# Patient Record
Sex: FEMALE | Race: WHITE | NOT HISPANIC OR LATINO
[De-identification: names, ages, dates, MRNs, and addresses within clinical notes are randomized per-mention and may not be internally consistent; named-entity substitution may affect disease eponyms.]

---

## 2019-06-14 ENCOUNTER — RESULT REVIEW (OUTPATIENT)
Age: 34
End: 2019-06-14

## 2020-05-13 ENCOUNTER — RESULT REVIEW (OUTPATIENT)
Age: 35
End: 2020-05-13

## 2020-08-12 ENCOUNTER — RESULT REVIEW (OUTPATIENT)
Age: 35
End: 2020-08-12

## 2021-01-05 ENCOUNTER — APPOINTMENT (OUTPATIENT)
Dept: PODIATRY | Facility: CLINIC | Age: 36
End: 2021-01-05
Payer: COMMERCIAL

## 2021-01-05 VITALS — WEIGHT: 115 LBS | BODY MASS INDEX: 18.05 KG/M2 | HEIGHT: 67 IN

## 2021-01-05 DIAGNOSIS — S90.32XA CONTUSION OF LEFT FOOT, INITIAL ENCOUNTER: ICD-10-CM

## 2021-01-05 DIAGNOSIS — Z78.9 OTHER SPECIFIED HEALTH STATUS: ICD-10-CM

## 2021-01-05 DIAGNOSIS — Z83.49 FAMILY HISTORY OF OTHER ENDOCRINE, NUTRITIONAL AND METABOLIC DISEASES: ICD-10-CM

## 2021-01-05 DIAGNOSIS — Z80.8 FAMILY HISTORY OF MALIGNANT NEOPLASM OF OTHER ORGANS OR SYSTEMS: ICD-10-CM

## 2021-01-05 DIAGNOSIS — S92.515A NONDISPLACED FRACTURE OF PROXIMAL PHALANX OF LEFT LESSER TOE(S), INITIAL ENCOUNTER FOR CLOSED FRACTURE: ICD-10-CM

## 2021-01-05 DIAGNOSIS — Z87.891 PERSONAL HISTORY OF NICOTINE DEPENDENCE: ICD-10-CM

## 2021-01-05 PROBLEM — Z00.00 ENCOUNTER FOR PREVENTIVE HEALTH EXAMINATION: Status: ACTIVE | Noted: 2021-01-05

## 2021-01-05 PROCEDURE — 99203 OFFICE O/P NEW LOW 30 MIN: CPT | Mod: 25

## 2021-01-05 PROCEDURE — 73630 X-RAY EXAM OF FOOT: CPT | Mod: LT

## 2021-01-05 PROCEDURE — 99072 ADDL SUPL MATRL&STAF TM PHE: CPT

## 2021-01-05 PROCEDURE — 29540 STRAPPING ANKLE &/FOOT: CPT

## 2021-01-05 RX ORDER — CALCIUM CARBONATE 300MG(750)
0.4-32.5 TABLET,CHEWABLE ORAL
Refills: 0 | Status: ACTIVE | COMMUNITY

## 2021-01-05 NOTE — PHYSICAL EXAM
[General Appearance - Alert] : alert [General Appearance - In No Acute Distress] : in no acute distress [] : no rash [Skin Lesions] : no skin lesions Unable to assess [Sensation] : the sensory exam was normal to light touch and pinprick [No Focal Deficits] : no focal deficits [Deep Tendon Reflexes (DTR)] : deep tendon reflexes were 2+ and symmetric [Motor Exam] : the motor exam was normal [Oriented To Time, Place, And Person] : oriented to person, place, and time [Impaired Insight] : insight and judgment were intact [Affect] : the affect was normal [FreeTextEntry3] : Vascular exam reveals palpable pedal pulses, the foot is warm to touch, there was good capillary fill time, the skin is normal in appearance there is no evidence of vascular disease or compromise at this time [de-identified] : NWB on LLE 2/2 increased pain, swelling of 4th toe left foot, no evidence of out of alignment or dislocation [Foot Ulcer] : no foot ulcer [Skin Induration] : no skin induration [FreeTextEntry1] : mild swelling and ecchymosis to area of CC

## 2021-01-05 NOTE — HISTORY OF PRESENT ILLNESS
[Sneakers] : saulo [Crutches] : crutches [FreeTextEntry1] : Location:4th toe left foot\par Duration: recent trauma --stubbed against furniture\par Acute: yes\par Past Tx: self\par Exacerbated by:WB\par Prior Hx:no\par

## 2021-01-05 NOTE — REVIEW OF SYSTEMS
[Joint Swelling] : joint swelling [Joint Stiffness] : joint stiffness [Negative] : Psychiatric [Leg Claudication] : no intermittent leg claudication [Lower Ext Edema] : no lower extremity edema [Arthralgias] : no arthralgias [Joint Pain] : no joint pain [Limb Pain] : no limb pain [Limb Swelling] : no limb swelling

## 2021-01-05 NOTE — PROCEDURE
[FreeTextEntry1] : X-rays were taken in the office multiple views of the left foot\par ND fx PP left 4th toe\par after examination and reviewing of the x-rays with the patient it was determined that there was a fracture present. Possible etiologies and treatment options both conservative as well as surgical were discussed at length with the patient. Approximate healing times were discussed with the patient both surgical as well as conservative treatments. It was stressed that these are estimates and there are many variables regarding healing the patient to patient. The fracture care, fracture principles, and the following appropriate treatment advice was stressed to the patient. Compliance was also stressed. I went over my treatment suggestions which included but were not limited to be my advice on which treatment the patient should pursue, appropriate offloading if necessary, shoe gear and assistive devices, immobilization or splinting and possible orthotic therapy. I advised the patient that medical noncompliance regarding treatment and treatment suggestions could result in worsening of the fracture which could require future surgical intervention, nonhealing of the fracture, and long-term deformity and disability as well as chronic pain.\par I had a lengthy discussion with the patient and she requests immobilization via a cam walker. After reviewing the benefits as well as the risks of being placed in a cam walker the patient has agreed. Next a new cam walker was removed from its packaging and customized to fit the patient's foot and leg and was instructed on how to use the Cam Walker. They were advised that they need to stay in the cam walker at times except showering and sleeping. I advised the patient that they should not drive with a cam walker. The Cam Walker was placed on the appropriate limb, instructions on how to ambulate with the cam walker were reviewed and the patient showed a knowledge on how to walk, remove, and reapply the cam walker. Complete discharge instructions were given as well as a follow up appointment.\par follow up appt 4 weeks if sx persist

## 2021-01-05 NOTE — REASON FOR VISIT
[Initial Visit] : an initial visit for [Foot Pain] : foot pain [Spouse] : spouse [FreeTextEntry2] : left

## 2021-10-21 ENCOUNTER — APPOINTMENT (OUTPATIENT)
Dept: NEUROLOGY | Facility: CLINIC | Age: 36
End: 2021-10-21
Payer: COMMERCIAL

## 2021-10-21 DIAGNOSIS — H53.9 UNSPECIFIED VISUAL DISTURBANCE: ICD-10-CM

## 2021-10-21 DIAGNOSIS — G43.009 MIGRAINE W/OUT AURA, NOT INTRACTABLE, W/OUT STATUS MIGRAINOSUS: ICD-10-CM

## 2021-10-21 PROCEDURE — 99213 OFFICE O/P EST LOW 20 MIN: CPT | Mod: 95

## 2021-10-21 NOTE — PHYSICAL EXAM
[General Appearance - Alert] : alert [General Appearance - In No Acute Distress] : in no acute distress [General Appearance - Well Nourished] : well nourished [General Appearance - Well-Appearing] : healthy appearing [Oriented To Time, Place, And Person] : oriented to person, place, and time [Impaired Insight] : insight and judgment were intact [Affect] : the affect was normal [Memory Recent] : recent memory was not impaired [Cranial Nerves Facial (VII)] : face symmetrical [Cranial Nerves Vestibulocochlear (VIII)] : hearing was intact bilaterally [Involuntary Movements] : no involuntary movements were seen

## 2021-10-21 NOTE — HISTORY OF PRESENT ILLNESS
[FreeTextEntry1] : Pt is 37 yo RH F seen for follow up for recent ER admission.  \par \par Pt seen via telehealth\par \par Pt seen by me at [facility] ER on 9/22 for an episode of blurry vision.\par \par At the time, pt reported transient episode of blurry vision around 9:20am.  She reported seeing color lines in her right eye, forming a crescent shape of squiggly line ("sort of like statics in old tv"), lasting for 15min, then losing some vision for about 5min at the end. Symptom resolved in total of 20 min. Pt reports that this was actually the third time this episode happened during this pregnancy,  with 2 shorter episodes occurring during 2nd-3rd trimester . Pt with 2 prior deliveries, no similar episodes during then. Pt has sporadic hx of migraine in her lifetime, some with photophobia but never the same presentation that she has been experiencing.  \par \par All symptoms resolved prior to pt's visit. Pt was pending induced labor the following day, and was discharged home.\par \par Pt seen via telehealth today. Since then, pt reports 2 additional episodes: \par 9/27/21 : symptom in left eye , similar presentation\par 10/13/21: symptom in right eye, similar but no loss of vision.\par \par Pt denies any inc stress/worsening sleep during the episode. During current telehealth visit, pt denies any deficits.\par \par \par Soc Hx: no smoking, no etoh\par Fam Hx: mother with ocular migraine

## 2021-10-21 NOTE — ASSESSMENT
[FreeTextEntry1] : Pt is 37 yo RH F   presenting with blurry vision.  Pt pregnant at the time, now s/p induced labor.   symptom of mono-ocular vision changes with crescent shape and positive symptom (squiggly vision changes) at onset does not suggest stroke/retinal artery occlusion at onset. 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 Will order MRI head c+/c-, c spine c+/c- to rule out demyelinating dz\par -  no HA with symptoms. even if this was atypical migraine, no abortive/preventive meds needed at this time.\par - follow up after imaging.\par

## 2021-12-06 ENCOUNTER — RESULT REVIEW (OUTPATIENT)
Age: 36
End: 2021-12-06

## 2021-12-17 ENCOUNTER — RESULT REVIEW (OUTPATIENT)
Age: 36
End: 2021-12-17

## 2025-04-23 ENCOUNTER — NON-APPOINTMENT (OUTPATIENT)
Age: 40
End: 2025-04-23

## 2025-04-24 ENCOUNTER — APPOINTMENT (OUTPATIENT)
Dept: NEUROLOGY | Facility: CLINIC | Age: 40
End: 2025-04-24
Payer: COMMERCIAL

## 2025-04-24 ENCOUNTER — TRANSCRIPTION ENCOUNTER (OUTPATIENT)
Age: 40
End: 2025-04-24

## 2025-04-24 ENCOUNTER — NON-APPOINTMENT (OUTPATIENT)
Age: 40
End: 2025-04-24

## 2025-04-24 VITALS
WEIGHT: 149 LBS | SYSTOLIC BLOOD PRESSURE: 108 MMHG | DIASTOLIC BLOOD PRESSURE: 66 MMHG | HEIGHT: 67 IN | OXYGEN SATURATION: 98 % | HEART RATE: 65 BPM | BODY MASS INDEX: 23.39 KG/M2

## 2025-04-24 DIAGNOSIS — G43.109 MIGRAINE WITH AURA, NOT INTRACTABLE, W/OUT STATUS MIGRAINOSUS: ICD-10-CM

## 2025-04-24 DIAGNOSIS — Z82.3 FAMILY HISTORY OF STROKE: ICD-10-CM

## 2025-04-24 PROCEDURE — 99205 OFFICE O/P NEW HI 60 MIN: CPT

## 2025-04-26 PROBLEM — G43.109 OCULAR MIGRAINE: Status: ACTIVE | Noted: 2025-04-26

## 2025-04-26 RX ORDER — PNV NO.95/FERROUS FUM/FOLIC AC 28MG-0.8MG
325 TABLET ORAL
Refills: 0 | Status: ACTIVE | COMMUNITY

## 2025-05-02 ENCOUNTER — RESULT REVIEW (OUTPATIENT)
Age: 40
End: 2025-05-02

## 2025-05-02 ENCOUNTER — APPOINTMENT (OUTPATIENT)
Dept: HEMATOLOGY ONCOLOGY | Facility: CLINIC | Age: 40
End: 2025-05-02
Payer: COMMERCIAL

## 2025-05-02 VITALS
WEIGHT: 155.25 LBS | BODY MASS INDEX: 24.37 KG/M2 | OXYGEN SATURATION: 99 % | SYSTOLIC BLOOD PRESSURE: 102 MMHG | HEART RATE: 68 BPM | HEIGHT: 67 IN | DIASTOLIC BLOOD PRESSURE: 62 MMHG | TEMPERATURE: 98.1 F | RESPIRATION RATE: 16 BRPM

## 2025-05-02 DIAGNOSIS — D64.9 ANEMIA, UNSPECIFIED: ICD-10-CM

## 2025-05-02 DIAGNOSIS — O99.119 OTHER DISEASES OF THE BLOOD AND BLOOD-FORMING ORGANS AND CERTAIN DISORDERS INVOLVING THE IMMUNE MECHANISM COMPLICATING PREGNANCY, UNSPECIFIED TRIMESTER: ICD-10-CM

## 2025-05-02 DIAGNOSIS — D69.6 OTHER DISEASES OF THE BLOOD AND BLOOD-FORMING ORGANS AND CERTAIN DISORDERS INVOLVING THE IMMUNE MECHANISM COMPLICATING PREGNANCY, UNSPECIFIED TRIMESTER: ICD-10-CM

## 2025-05-02 PROCEDURE — 99205 OFFICE O/P NEW HI 60 MIN: CPT

## 2025-05-19 ENCOUNTER — RESULT REVIEW (OUTPATIENT)
Age: 40
End: 2025-05-19

## 2025-05-19 ENCOUNTER — APPOINTMENT (OUTPATIENT)
Dept: HEMATOLOGY ONCOLOGY | Facility: CLINIC | Age: 40
End: 2025-05-19

## 2025-05-19 VITALS
DIASTOLIC BLOOD PRESSURE: 57 MMHG | HEIGHT: 67 IN | TEMPERATURE: 97.2 F | BODY MASS INDEX: 25.14 KG/M2 | OXYGEN SATURATION: 99 % | HEART RATE: 72 BPM | RESPIRATION RATE: 16 BRPM | SYSTOLIC BLOOD PRESSURE: 98 MMHG | WEIGHT: 160.19 LBS

## 2025-05-19 RX ORDER — DEXAMETHASONE 4 MG/1
4 TABLET ORAL DAILY
Qty: 40 | Refills: 0 | Status: ACTIVE | COMMUNITY
Start: 2025-05-19 | End: 1900-01-01

## 2025-05-23 ENCOUNTER — APPOINTMENT (OUTPATIENT)
Dept: HEMATOLOGY ONCOLOGY | Facility: CLINIC | Age: 40
End: 2025-05-23

## 2025-05-27 ENCOUNTER — RESULT REVIEW (OUTPATIENT)
Age: 40
End: 2025-05-27

## 2025-05-28 ENCOUNTER — TRANSCRIPTION ENCOUNTER (OUTPATIENT)
Age: 40
End: 2025-05-28

## 2025-05-31 ENCOUNTER — TRANSCRIPTION ENCOUNTER (OUTPATIENT)
Age: 40
End: 2025-05-31

## 2025-08-18 ENCOUNTER — APPOINTMENT (OUTPATIENT)
Dept: NEUROLOGY | Facility: CLINIC | Age: 40
End: 2025-08-18